# Patient Record
Sex: FEMALE | Race: WHITE | NOT HISPANIC OR LATINO | Employment: OTHER | URBAN - METROPOLITAN AREA
[De-identification: names, ages, dates, MRNs, and addresses within clinical notes are randomized per-mention and may not be internally consistent; named-entity substitution may affect disease eponyms.]

---

## 2021-12-13 ENCOUNTER — NEW PATIENT (OUTPATIENT)
Dept: URBAN - METROPOLITAN AREA CLINIC 26 | Facility: CLINIC | Age: 76
End: 2021-12-13

## 2021-12-13 VITALS
WEIGHT: 130 LBS | DIASTOLIC BLOOD PRESSURE: 91 MMHG | HEART RATE: 91 BPM | HEIGHT: 62 IN | BODY MASS INDEX: 23.92 KG/M2 | SYSTOLIC BLOOD PRESSURE: 138 MMHG

## 2021-12-13 DIAGNOSIS — H35.341: ICD-10-CM

## 2021-12-13 DIAGNOSIS — H43.811: ICD-10-CM

## 2021-12-13 DIAGNOSIS — H35.351: ICD-10-CM

## 2021-12-13 DIAGNOSIS — H43.11: ICD-10-CM

## 2021-12-13 DIAGNOSIS — G80.9: ICD-10-CM

## 2021-12-13 DIAGNOSIS — H02.832: ICD-10-CM

## 2021-12-13 DIAGNOSIS — H35.373: ICD-10-CM

## 2021-12-13 DIAGNOSIS — H53.002: ICD-10-CM

## 2021-12-13 DIAGNOSIS — H02.835: ICD-10-CM

## 2021-12-13 DIAGNOSIS — Q14.1: ICD-10-CM

## 2021-12-13 DIAGNOSIS — H35.363: ICD-10-CM

## 2021-12-13 PROCEDURE — 99204 OFFICE O/P NEW MOD 45 MIN: CPT

## 2021-12-13 PROCEDURE — 92250 FUNDUS PHOTOGRAPHY W/I&R: CPT

## 2021-12-13 PROCEDURE — 92201 OPSCPY EXTND RTA DRAW UNI/BI: CPT

## 2021-12-13 PROCEDURE — 92134 CPTRZ OPH DX IMG PST SGM RTA: CPT

## 2021-12-13 ASSESSMENT — VISUAL ACUITY
OS_PH: 20/400+2
OS_SC: 20/400
OD_SC: 20/40

## 2021-12-13 ASSESSMENT — TONOMETRY
OD_IOP_MMHG: 11
OS_IOP_MMHG: 17

## 2021-12-13 NOTE — PATIENT DISCUSSION
12/13/21: AFTER RESOLUTION OF VH. Continue to MONITOR CLOSELY to determine the need for TREATMENT and INCREASE/DECREASE in length of time till next follow up visit.

## 2021-12-13 NOTE — PATIENT DISCUSSION
12/13/21: The patient has developed an ACUTE HEMORRHAGIC PVD. No holes or tears were seen on clinical exam today. Reviewed the signs and symptoms of retinal tear/retinal detachment and the importance calling for prompt evaluation should there be increasing floaters, new flashing lights, or decreasing peripheral vision in either eye at any time. Close follow up is recommended due to higher risk or a tear in a hemorrhagic PVD.

## 2021-12-21 ENCOUNTER — FOLLOW UP (OUTPATIENT)
Dept: URBAN - METROPOLITAN AREA CLINIC 26 | Facility: CLINIC | Age: 76
End: 2021-12-21

## 2021-12-21 DIAGNOSIS — H35.341: ICD-10-CM

## 2021-12-21 DIAGNOSIS — H35.373: ICD-10-CM

## 2021-12-21 DIAGNOSIS — H35.351: ICD-10-CM

## 2021-12-21 DIAGNOSIS — H43.11: ICD-10-CM

## 2021-12-21 DIAGNOSIS — Q14.1: ICD-10-CM

## 2021-12-21 DIAGNOSIS — H43.811: ICD-10-CM

## 2021-12-21 DIAGNOSIS — H35.363: ICD-10-CM

## 2021-12-21 PROCEDURE — 92250 FUNDUS PHOTOGRAPHY W/I&R: CPT

## 2021-12-21 PROCEDURE — 92012 INTRM OPH EXAM EST PATIENT: CPT

## 2021-12-21 ASSESSMENT — TONOMETRY
OS_IOP_MMHG: 16
OD_IOP_MMHG: 16

## 2021-12-21 ASSESSMENT — VISUAL ACUITY
OS_SC: 20/200-2
OD_SC: 20/25-2

## 2022-01-05 ENCOUNTER — FOLLOW UP (OUTPATIENT)
Dept: URBAN - METROPOLITAN AREA CLINIC 26 | Facility: CLINIC | Age: 77
End: 2022-01-05

## 2022-01-05 VITALS — BODY MASS INDEX: 23.92 KG/M2 | WEIGHT: 130 LBS | HEIGHT: 62 IN

## 2022-01-05 DIAGNOSIS — H35.411: ICD-10-CM

## 2022-01-05 DIAGNOSIS — H35.351: ICD-10-CM

## 2022-01-05 DIAGNOSIS — H43.11: ICD-10-CM

## 2022-01-05 DIAGNOSIS — H43.811: ICD-10-CM

## 2022-01-05 DIAGNOSIS — H35.341: ICD-10-CM

## 2022-01-05 DIAGNOSIS — Q14.1: ICD-10-CM

## 2022-01-05 DIAGNOSIS — H35.373: ICD-10-CM

## 2022-01-05 DIAGNOSIS — H35.363: ICD-10-CM

## 2022-01-05 PROCEDURE — 92250 FUNDUS PHOTOGRAPHY W/I&R: CPT

## 2022-01-05 PROCEDURE — 92012 INTRM OPH EXAM EST PATIENT: CPT

## 2022-01-05 ASSESSMENT — VISUAL ACUITY
OS_SC: 20/200+1
OD_PH: 20/30+1
OD_SC: 20/50

## 2022-01-05 ASSESSMENT — TONOMETRY
OS_IOP_MMHG: 13
OD_IOP_MMHG: 19

## 2022-01-05 NOTE — PATIENT DISCUSSION
1/5/22: No retinal tears or retinal detachment seen on clinical exam today. Reviewed the signs and symptoms of retinal tear/retinal detachment and the importance of calling for prompt evaluation should there be increasing floaters, new flashing lights, or decreasing peripheral vision in either eye at any time. Observation recommended.

## 2022-03-07 ENCOUNTER — FOLLOW UP (OUTPATIENT)
Dept: URBAN - METROPOLITAN AREA CLINIC 26 | Facility: CLINIC | Age: 77
End: 2022-03-07

## 2022-03-07 DIAGNOSIS — H35.363: ICD-10-CM

## 2022-03-07 DIAGNOSIS — H43.11: ICD-10-CM

## 2022-03-07 DIAGNOSIS — H35.351: ICD-10-CM

## 2022-03-07 DIAGNOSIS — H35.373: ICD-10-CM

## 2022-03-07 DIAGNOSIS — H35.411: ICD-10-CM

## 2022-03-07 DIAGNOSIS — H43.811: ICD-10-CM

## 2022-03-07 DIAGNOSIS — H35.341: ICD-10-CM

## 2022-03-07 DIAGNOSIS — Q14.1: ICD-10-CM

## 2022-03-07 PROCEDURE — 92012 INTRM OPH EXAM EST PATIENT: CPT

## 2022-03-07 PROCEDURE — 92134 CPTRZ OPH DX IMG PST SGM RTA: CPT

## 2022-03-07 ASSESSMENT — VISUAL ACUITY
OS_SC: 20/200
OD_PH: 20/20-2
OD_SC: 20/40-2

## 2022-03-07 ASSESSMENT — TONOMETRY
OS_IOP_MMHG: 11
OD_IOP_MMHG: 11

## 2022-03-07 NOTE — PATIENT DISCUSSION
3/7/22: No retinal tears or retinal detachment seen on clinical exam today. Reviewed the signs and symptoms of retinal tear/retinal detachment and the importance of calling for prompt evaluation should there be increasing floaters, new flashing lights, or decreasing peripheral vision in either eye at any time. Observation recommended.

## 2022-03-07 NOTE — PATIENT DISCUSSION
12/13/21: AFTER RESOLUTION OF VH. Continue to MONITOR CLOSELY to determine the need for TREATMENT and INCREASE/DECREASE in length of time till next follow up visit. Attending Attestation (For Attendings USE Only)...

## 2022-03-07 NOTE — PATIENT DISCUSSION
3/7/22: REVIEWED SX AND PT WOULD LIKE TO HOLD OFF SURGERY FOR NOW. DISCUSSED THAT IF VISION DECLINES, SHE NEEDS TO CONSIDER IT GIVEN MONOCULAR VISION.

## 2024-02-19 ENCOUNTER — OV NP (OUTPATIENT)
Dept: URBAN - METROPOLITAN AREA CLINIC 63 | Facility: CLINIC | Age: 79
End: 2024-02-19
Payer: MEDICARE

## 2024-02-19 VITALS
SYSTOLIC BLOOD PRESSURE: 130 MMHG | HEIGHT: 62 IN | DIASTOLIC BLOOD PRESSURE: 90 MMHG | TEMPERATURE: 96 F | WEIGHT: 138.2 LBS | HEART RATE: 88 BPM | OXYGEN SATURATION: 98 % | BODY MASS INDEX: 25.43 KG/M2

## 2024-02-19 DIAGNOSIS — K58.0 IRRITABLE BOWEL SYNDROME WITH DIARRHEA: ICD-10-CM

## 2024-02-19 PROBLEM — 197125005: Status: ACTIVE | Noted: 2024-02-19

## 2024-02-19 PROCEDURE — 99204 OFFICE O/P NEW MOD 45 MIN: CPT | Performed by: NURSE PRACTITIONER

## 2024-02-19 RX ORDER — BACLOFEN 20 MG/1
TAKE 1 TABLET BY MOUTH THREE TIMES DAILY AS NEEDED TABLET ORAL
Qty: 255 EACH | Refills: 0 | Status: ACTIVE | COMMUNITY

## 2024-02-19 RX ORDER — ESTRADIOL 0.1 MG/G
APPLY 0.5 GRAM USING FINGER TIP METHOD TWO TIMES WEEKLY AS DIRECTED BY PROVIDER CREAM VAGINAL
Qty: 42.5 GRAM | Refills: 0 | Status: ACTIVE | COMMUNITY

## 2024-02-19 RX ORDER — AMITRIPTYLINE HYDROCHLORIDE 50 MG/1
TABLET, FILM COATED ORAL
Qty: 90 TABLET | Status: ACTIVE | COMMUNITY

## 2024-02-19 RX ORDER — CHLORDIAZEPOXIDE HYDROCHLORIDE 5 MG/1
TAKE 1 CAPSULE BY MOUTH TWICE DAILY CAPSULE ORAL
Qty: 60 EACH | Refills: 2 | Status: ACTIVE | COMMUNITY

## 2024-02-19 RX ORDER — OXYBUTYNIN CHLORIDE 5 MG/1
TAKE 1 TABLET BY MOUTH ONCE A DAY TABLET ORAL
Qty: 85 EACH | Refills: 1 | Status: ACTIVE | COMMUNITY

## 2024-02-19 RX ORDER — OMEPRAZOLE 20 MG/1
TAKE 1 CAPSULE BY MOUTH TWICE DAILY CAPSULE, DELAYED RELEASE ORAL
Qty: 180 EACH | Refills: 0 | Status: ACTIVE | COMMUNITY

## 2024-02-19 RX ORDER — VORTIOXETINE 10 MG/1
TABLET, FILM COATED ORAL
Qty: 90 TABLET | Status: ACTIVE | COMMUNITY

## 2024-02-19 RX ORDER — ROPINIROLE 0.5 MG/1
TAKE 1 TABLET BY MOUTH EVERY DAY TABLET, FILM COATED ORAL
Qty: 90 EACH | Refills: 0 | Status: ACTIVE | COMMUNITY

## 2024-02-19 RX ORDER — POTASSIUM CHLORIDE EXTENDED-RELEASE 750 MG/1
TABLET ORAL
Qty: 7 TABLET | Status: ACTIVE | COMMUNITY

## 2024-02-19 NOTE — HPI-PREVIOUS LABS
Stool studies dated 30 November 2023 are all negative. ********** Lab work dated 29 November 2023 demonstrates the following abnormalities: RBC 3.57, hemoglobin 11.2, RDW 14.6%, MPV 8.6, neutrophils 39.6%, lymphocytes 42.3%, monocytes 12.6%, sodium 133, anion gap 4, glucose 123, creatinine 0.40, calcium 8.1. All remaining lab values of CBC, BMP and magnesium are within normal limits.

## 2024-02-19 NOTE — HPI-PREVIOUS IMAGING
CT abdomen and pelvis with contrast/30 November 2023. Proctitis. Stool burden compatible with constipation. ********** CT abdomen and pelvis without contrast/29 November 2023. 1. Thickening of the rectum which may be secondary to under distention. Constipation with diffuse stool throughout the colon. 2. Nonobstructing left upper pole renal calcification. No evidence for hydronephrosis. 3. Age-indeterminate compression fracture deformities of the superior endplate of T11 and T12.

## 2024-02-19 NOTE — HPI-TODAY'S VISIT:
Thank you very much for kindly referring Radha Couch, a very pleasant 78-year-old female, to our service due to diarrhea and for possible colon screening.  Past medical history significant for cerebral palsy, anxiety, depression, EtOH abuse, IBS-D and GERD.  Past surgical history significant for cholecystectomy, herniorrhaphy, hysterectomy, tubal ligation, vaginal sling, back and foot procedures.  She reports her last complete colonoscopy was 2016 and denies a personal history of colon polyps, but relates a family history of colon cancer in a maternal grandmother (70+).  Radha presents today complaining of irregular bowel movements, ranging from, "diarrhea constipation" that is well-controlled with Imodium and MiraLAX as needed.  She relates an episode of, "watery diarrhea" approximately 2 months ago that preceded a hospitalization after a fall.  When she was there, stool studies were negative for infectious etiology and since that time, her bowel habits have returned to, "normal".  She is otherwise asymptomatic from a GI perspective and relates office visit with Dr. Meet Hassan secondary to CT findings of proctitis.  When she was there, and anoscopy was performed and she relates there were no clinical findings or recommendations for colonoscopy.  She denies nausea, vomiting, pyrosis, dysphagia, dyspepsia, abdominal pain, rectal bleeding, melena or unintentional weight loss.

## 2024-08-23 ENCOUNTER — OFFICE VISIT (OUTPATIENT)
Dept: URBAN - METROPOLITAN AREA CLINIC 60 | Facility: CLINIC | Age: 79
End: 2024-08-23